# Patient Record
Sex: MALE | Race: BLACK OR AFRICAN AMERICAN | NOT HISPANIC OR LATINO | Employment: FULL TIME | URBAN - METROPOLITAN AREA
[De-identification: names, ages, dates, MRNs, and addresses within clinical notes are randomized per-mention and may not be internally consistent; named-entity substitution may affect disease eponyms.]

---

## 2024-09-08 ENCOUNTER — HOSPITAL ENCOUNTER (EMERGENCY)
Facility: HOSPITAL | Age: 34
Discharge: HOME | End: 2024-09-09
Attending: STUDENT IN AN ORGANIZED HEALTH CARE EDUCATION/TRAINING PROGRAM
Payer: MEDICAID

## 2024-09-08 DIAGNOSIS — J02.9 PHARYNGITIS, UNSPECIFIED ETIOLOGY: Primary | ICD-10-CM

## 2024-09-08 PROCEDURE — 99284 EMERGENCY DEPT VISIT MOD MDM: CPT

## 2024-09-08 PROCEDURE — 87651 STREP A DNA AMP PROBE: CPT | Performed by: STUDENT IN AN ORGANIZED HEALTH CARE EDUCATION/TRAINING PROGRAM

## 2024-09-09 VITALS
DIASTOLIC BLOOD PRESSURE: 82 MMHG | OXYGEN SATURATION: 98 % | WEIGHT: 200 LBS | SYSTOLIC BLOOD PRESSURE: 133 MMHG | HEART RATE: 77 BPM | BODY MASS INDEX: 29.62 KG/M2 | RESPIRATION RATE: 20 BRPM | TEMPERATURE: 98 F | HEIGHT: 69 IN

## 2024-09-09 LAB
MONO SCR (OHS): NEGATIVE
STREP A PCR (OHS): NOT DETECTED

## 2024-09-09 PROCEDURE — 86308 HETEROPHILE ANTIBODY SCREEN: CPT | Performed by: STUDENT IN AN ORGANIZED HEALTH CARE EDUCATION/TRAINING PROGRAM

## 2024-09-09 PROCEDURE — 96372 THER/PROPH/DIAG INJ SC/IM: CPT | Performed by: STUDENT IN AN ORGANIZED HEALTH CARE EDUCATION/TRAINING PROGRAM

## 2024-09-09 PROCEDURE — 63600175 PHARM REV CODE 636 W HCPCS: Performed by: STUDENT IN AN ORGANIZED HEALTH CARE EDUCATION/TRAINING PROGRAM

## 2024-09-09 RX ORDER — ONDANSETRON 4 MG/1
4 TABLET, ORALLY DISINTEGRATING ORAL EVERY 6 HOURS PRN
Qty: 12 TABLET | Refills: 0 | Status: SHIPPED | OUTPATIENT
Start: 2024-09-09

## 2024-09-09 RX ORDER — AMOXICILLIN AND CLAVULANATE POTASSIUM 875; 125 MG/1; MG/1
1 TABLET, FILM COATED ORAL 2 TIMES DAILY
Qty: 14 TABLET | Refills: 0 | Status: SHIPPED | OUTPATIENT
Start: 2024-09-09

## 2024-09-09 RX ORDER — IBUPROFEN 600 MG/1
600 TABLET ORAL EVERY 6 HOURS PRN
Qty: 20 TABLET | Refills: 0 | Status: SHIPPED | OUTPATIENT
Start: 2024-09-09

## 2024-09-09 RX ORDER — KETOROLAC TROMETHAMINE 30 MG/ML
30 INJECTION, SOLUTION INTRAMUSCULAR; INTRAVENOUS
Status: COMPLETED | OUTPATIENT
Start: 2024-09-09 | End: 2024-09-09

## 2024-09-09 RX ADMIN — KETOROLAC TROMETHAMINE 30 MG: 30 INJECTION, SOLUTION INTRAMUSCULAR; INTRAVENOUS at 01:09

## 2024-09-09 NOTE — ED PROVIDER NOTES
Encounter Date: 9/8/2024    SCRIBE #1 NOTE: I, Pat Sotero, am scribing for, and in the presence of,  Jerad Butler MD. I have scribed the following portions of the note - Other sections scribed: HPI, ROS, PE.       History     Chief Complaint   Patient presents with    Sore Throat     Pt presents c/o sore throat/ swollen tonsils for last 2 weeks. Pt states had strep throat about a month ago took a x10 day course of abx swelling decreased and re inflamed. Left side tonsils swollen in triage.      Patient is a 34-year-old male presenting to the ED with c/o sore throat. Pt reports sore throat onset 2 weeks ago. He states that he was diagnosed with strep throat a month ago and took a 10 day course of antibiotics which improved his symptoms but did not resolve them. He states that his symptoms have again worsened. Associated symptoms include cough. He denies any fever or chills.     The history is provided by the patient. No  was used.     Review of patient's allergies indicates:  No Known Allergies  No past medical history on file.  No past surgical history on file.  No family history on file.     Review of Systems   Constitutional:  Negative for chills and fever.   HENT:  Positive for sore throat.    Respiratory:  Positive for cough.        Physical Exam     Initial Vitals [09/08/24 2249]   BP Pulse Resp Temp SpO2   130/80 89 20 97.6 °F (36.4 °C) 97 %      MAP       --         Physical Exam    Nursing note and vitals reviewed.  Constitutional: He appears well-developed.   HENT:   Mouth/Throat: Uvula is midline.   Exudate to the left tonsil.   No uvular swelling or deviation.  No tongue swelling.    Eyes: EOM are normal. Pupils are equal, round, and reactive to light.   Cardiovascular:  Normal rate and regular rhythm.           No murmur heard.  Pulmonary/Chest: Breath sounds normal. No respiratory distress. He has no wheezes. He has no rales.   Abdominal: Abdomen is soft. He exhibits no  distension. There is no abdominal tenderness.     Neurological:   The pt is answering questions appropriately.  Full ROM.    Skin: Skin is warm. Capillary refill takes less than 2 seconds. No rash noted.         ED Course   Procedures  Labs Reviewed   STREP GROUP A BY PCR - Normal       Result Value    STREP A PCR ([facility]) Not Detected      Narrative:     The Xpert Xpress Strep A test is a rapid, qualitative in vitro diagnostic test for the detection of Streptococcus pyogenes (Group A ß-hemolytic Streptococcus, Strep A) in throat swab specimens from patients with signs and symptoms of pharyngitis.     MONONUCLEOSIS SCREEN - Normal    Mononucleosis Screen Negative            Imaging Results    None          Medications   ketorolac injection 30 mg (30 mg Intramuscular Given 9/9/24 0109)     Medical Decision Making  Problems Addressed:  Pharyngitis, unspecified etiology: acute illness or injury    Amount and/or Complexity of Data Reviewed  Labs: ordered. Decision-making details documented in ED Course.    Risk  Prescription drug management.    Differential diagnosis (includes but is not limited to):   Viral pharyngitis, strep pharyngitis, abscess, tonsillitis    MDM Narrative  34-year-old male presents for evaluation of sore throat over the past couple of days.  He states he recently completed a course of antibiotics for strep throat about a month ago.  Swabs reviewed.  Patient reports symptoms improved with medications.  Prescriptions for symptom control provided.  Need for follow up with the PCP discussed and patient verbalized understanding.  Strict return precautions discussed and the patient has verbalized understanding.  Patient remains afebrile and hemodynamically stable and is stable on room air with no evidence of respiratory distress.    Dispo: Discharge    My independent radiology interpretation:   Point of care US (independently performed and interpreted):   Decision rules/clinical scoring:     Sepsis  Perfusion Assessment:     Amount and/or Complexity of Data Reviewed  Independent historian: none   Summary of history:   External data reviewed: notes from previous ED visits  Summary of data reviewed:  Prior records reviewed  Risk and benefits of testing: discussed   Labs: ordered and reviewed  Discussion of management or test interpretation with external provider(s): none   Summary of discussion:     Risk  OTC medications  Prescription drug management   Shared decision making     Critical Care  none    Data Reviewed/Counseling: I have personally reviewed the patient's vital signs, nursing notes, and other relevant tests, information, and imaging. I had a detailed discussion regarding the historical points, exam findings, and any diagnostic results supporting the discharge diagnosis. I personally performed the history, PE, MDM and procedures as documented above and agree with the scribe's documentation.    Portions of this note were dictated using voice recognition software. Although it was reviewed for accuracy, some inherent voice recognition errors may have occurred and may be present in this document.         Scribe Attestation:   Scribe #1: I performed the above scribed service and the documentation accurately describes the services I performed. I attest to the accuracy of the note.    Attending Attestation:           Physician Attestation for Scribe:  Physician Attestation Statement for Scribe #1: I, Jerad Butler MD, reviewed documentation, as scribed by Pat Bey in my presence, and it is both accurate and complete.             ED Course as of 09/09/24 0210   Mon Sep 09, 2024   0159 I have reassessed the patient.  Patient is resting comfortably, no acute distress.  Vital signs stable.  Discussed all results including incidental findings.  Discussed need for follow up and discussed return precautions.  Answered all questions at this time.  Hemodynamically stable for continued outpatient management.  Patient verbalized understanding and agreed to plan.    [MC]      ED Course User Index  [MC] Jerad Butler MD                           Clinical Impression:  Final diagnoses:  [J02.9] Pharyngitis, unspecified etiology (Primary)          ED Disposition Condition    Discharge Stable          ED Prescriptions       Medication Sig Dispense Start Date End Date Auth. Provider    ibuprofen (ADVIL,MOTRIN) 600 MG tablet Take 1 tablet (600 mg total) by mouth every 6 (six) hours as needed for Pain. 20 tablet 9/9/2024 -- Jerad Butler MD    ondansetron (ZOFRAN-ODT) 4 MG TbDL Take 1 tablet (4 mg total) by mouth every 6 (six) hours as needed (Nausea). 12 tablet 9/9/2024 -- Jerad Butler MD    amoxicillin-clavulanate 875-125mg (AUGMENTIN) 875-125 mg per tablet Take 1 tablet by mouth 2 (two) times daily. 14 tablet 9/9/2024 -- Jerad Butler MD          Follow-up Information       Follow up With Specialties Details Why Contact Info    Your PCP  Schedule an appointment as soon as possible for a visit       [facility]  Schedule an appointment as soon as possible for a visit   [address] [facility] [address] [phone]      [facility] - Emergency Dept Emergency Medicine  If symptoms worsen [address] [facility] [id number] [phone]             Jerad Butler MD  09/09/24 0211

## 2024-09-09 NOTE — DISCHARGE INSTRUCTIONS
